# Patient Record
Sex: FEMALE | Race: BLACK OR AFRICAN AMERICAN | NOT HISPANIC OR LATINO | URBAN - METROPOLITAN AREA
[De-identification: names, ages, dates, MRNs, and addresses within clinical notes are randomized per-mention and may not be internally consistent; named-entity substitution may affect disease eponyms.]

---

## 2020-06-26 ENCOUNTER — OUTPATIENT (OUTPATIENT)
Dept: OUTPATIENT SERVICES | Facility: HOSPITAL | Age: 55
LOS: 1 days | End: 2020-06-26
Payer: COMMERCIAL

## 2020-06-26 DIAGNOSIS — Z11.59 ENCOUNTER FOR SCREENING FOR OTHER VIRAL DISEASES: ICD-10-CM

## 2020-06-26 DIAGNOSIS — Z98.89 OTHER SPECIFIED POSTPROCEDURAL STATES: Chronic | ICD-10-CM

## 2020-06-26 DIAGNOSIS — Z90.49 ACQUIRED ABSENCE OF OTHER SPECIFIED PARTS OF DIGESTIVE TRACT: Chronic | ICD-10-CM

## 2020-06-26 PROCEDURE — U0003: CPT

## 2020-06-27 LAB — SARS-COV-2 RNA SPEC QL NAA+PROBE: SIGNIFICANT CHANGE UP

## 2020-12-05 DIAGNOSIS — Z01.818 ENCOUNTER FOR OTHER PREPROCEDURAL EXAMINATION: ICD-10-CM

## 2020-12-05 PROBLEM — Z00.00 ENCOUNTER FOR PREVENTIVE HEALTH EXAMINATION: Status: ACTIVE | Noted: 2020-12-05

## 2020-12-07 ENCOUNTER — APPOINTMENT (OUTPATIENT)
Dept: DISASTER EMERGENCY | Facility: CLINIC | Age: 55
End: 2020-12-07

## 2020-12-10 ENCOUNTER — APPOINTMENT (OUTPATIENT)
Dept: PULMONOLOGY | Facility: CLINIC | Age: 55
End: 2020-12-10

## 2021-07-06 ENCOUNTER — TRANSCRIPTION ENCOUNTER (OUTPATIENT)
Age: 56
End: 2021-07-06

## 2021-08-07 ENCOUNTER — NON-APPOINTMENT (OUTPATIENT)
Age: 56
End: 2021-08-07

## 2021-08-07 ENCOUNTER — APPOINTMENT (OUTPATIENT)
Dept: OBGYN | Facility: CLINIC | Age: 56
End: 2021-08-07
Payer: COMMERCIAL

## 2021-08-07 VITALS
DIASTOLIC BLOOD PRESSURE: 64 MMHG | WEIGHT: 129 LBS | BODY MASS INDEX: 23.74 KG/M2 | SYSTOLIC BLOOD PRESSURE: 124 MMHG | HEIGHT: 62 IN

## 2021-08-07 DIAGNOSIS — Z01.419 ENCOUNTER FOR GYNECOLOGICAL EXAMINATION (GENERAL) (ROUTINE) W/OUT ABNORMAL FINDINGS: ICD-10-CM

## 2021-08-07 DIAGNOSIS — Z12.39 ENCOUNTER FOR OTHER SCREENING FOR MALIGNANT NEOPLASM OF BREAST: ICD-10-CM

## 2021-08-07 DIAGNOSIS — Z12.4 ENCOUNTER FOR SCREENING FOR MALIGNANT NEOPLASM OF CERVIX: ICD-10-CM

## 2021-08-07 DIAGNOSIS — Z13.820 ENCOUNTER FOR SCREENING FOR OSTEOPOROSIS: ICD-10-CM

## 2021-08-07 DIAGNOSIS — Z72.3 LACK OF PHYSICAL EXERCISE: ICD-10-CM

## 2021-08-07 DIAGNOSIS — Z80.0 FAMILY HISTORY OF MALIGNANT NEOPLASM OF DIGESTIVE ORGANS: ICD-10-CM

## 2021-08-07 PROCEDURE — 99386 PREV VISIT NEW AGE 40-64: CPT

## 2021-08-07 NOTE — HISTORY OF PRESENT ILLNESS
[Menarche Age: ____] : age at menarche was [unfilled] [Currently Active] : currently active [Men] : men [postmenopausal] : postmenopausal [N] : Patient is not sexually active [unknown] : Patient is unsure of the date of her LMP [Post-Menopause, No Sxs] : post-menopausal, currently without symptoms [FreeTextEntry1] : 55-year-old postmenopausal patient presents for annual gynecological exam without complaints\par \par She is Lazara Balderrama's mom-she is moving to [place] in the [facility] and very excited this month\par \par She eats well and believes in diet as medicine\par \par Denies any medical history or vaginal bleeding\par \par She has not had a mammogram or Pap smear in 5 years and the benefits were reviewed\par She reports a normal bone density in 2019\par She is scheduled for screening colonoscopy soon she states [Mammogramdate] : 2016 [TextBox_19] : WNL [PapSmeardate] : 2016 [TextBox_31] : WNL [BoneDensityDate] : 2019 [TextBox_37] : WNL [PGxTotal] : 7 [[facility]] : 6 [[facility]] : 6 [PGHxABSpont] : 1

## 2021-08-14 LAB
CYTOLOGY CVX/VAG DOC THIN PREP: ABNORMAL
HPV HIGH+LOW RISK DNA PNL CVX: NOT DETECTED

## 2022-08-20 ENCOUNTER — APPOINTMENT (OUTPATIENT)
Dept: OBGYN | Facility: CLINIC | Age: 57
End: 2022-08-20

## 2022-11-03 ENCOUNTER — EMERGENCY (EMERGENCY)
Facility: HOSPITAL | Age: 57
LOS: 1 days | Discharge: HOME | End: 2022-11-03
Attending: EMERGENCY MEDICINE | Admitting: EMERGENCY MEDICINE

## 2022-11-03 VITALS
OXYGEN SATURATION: 90 % | RESPIRATION RATE: 20 BRPM | SYSTOLIC BLOOD PRESSURE: 104 MMHG | TEMPERATURE: 98 F | HEART RATE: 133 BPM | DIASTOLIC BLOOD PRESSURE: 73 MMHG

## 2022-11-03 VITALS
OXYGEN SATURATION: 100 % | SYSTOLIC BLOOD PRESSURE: 101 MMHG | RESPIRATION RATE: 18 BRPM | DIASTOLIC BLOOD PRESSURE: 77 MMHG | HEART RATE: 110 BPM

## 2022-11-03 DIAGNOSIS — Z98.89 OTHER SPECIFIED POSTPROCEDURAL STATES: Chronic | ICD-10-CM

## 2022-11-03 DIAGNOSIS — Z90.49 ACQUIRED ABSENCE OF OTHER SPECIFIED PARTS OF DIGESTIVE TRACT: Chronic | ICD-10-CM

## 2022-11-03 LAB
ALBUMIN SERPL ELPH-MCNC: 4.1 G/DL — SIGNIFICANT CHANGE UP (ref 3.3–5)
ALP SERPL-CCNC: 69 U/L — SIGNIFICANT CHANGE UP (ref 40–120)
ALT FLD-CCNC: 11 U/L — SIGNIFICANT CHANGE UP (ref 4–33)
ANION GAP SERPL CALC-SCNC: 8 MMOL/L — SIGNIFICANT CHANGE UP (ref 7–14)
ANISOCYTOSIS BLD QL: SLIGHT — SIGNIFICANT CHANGE UP
AST SERPL-CCNC: 25 U/L — SIGNIFICANT CHANGE UP (ref 4–32)
BASOPHILS # BLD AUTO: 0.08 K/UL — SIGNIFICANT CHANGE UP (ref 0–0.2)
BASOPHILS NFR BLD AUTO: 2.6 % — HIGH (ref 0–2)
BILIRUB SERPL-MCNC: 1.8 MG/DL — HIGH (ref 0.2–1.2)
BUN SERPL-MCNC: 18 MG/DL — SIGNIFICANT CHANGE UP (ref 7–23)
BURR CELLS BLD QL SMEAR: PRESENT — SIGNIFICANT CHANGE UP
CALCIUM SERPL-MCNC: 9.6 MG/DL — SIGNIFICANT CHANGE UP (ref 8.4–10.5)
CHLORIDE SERPL-SCNC: 100 MMOL/L — SIGNIFICANT CHANGE UP (ref 98–107)
CO2 SERPL-SCNC: 29 MMOL/L — SIGNIFICANT CHANGE UP (ref 22–31)
CREAT SERPL-MCNC: 0.58 MG/DL — SIGNIFICANT CHANGE UP (ref 0.5–1.3)
D DIMER BLD IA.RAPID-MCNC: <150 NG/ML DDU — SIGNIFICANT CHANGE UP
EGFR: 105 ML/MIN/1.73M2 — SIGNIFICANT CHANGE UP
EOSINOPHIL # BLD AUTO: 0.13 K/UL — SIGNIFICANT CHANGE UP (ref 0–0.5)
EOSINOPHIL NFR BLD AUTO: 4.3 % — SIGNIFICANT CHANGE UP (ref 0–6)
FLUAV AG NPH QL: SIGNIFICANT CHANGE UP
FLUBV AG NPH QL: SIGNIFICANT CHANGE UP
GIANT PLATELETS BLD QL SMEAR: PRESENT — SIGNIFICANT CHANGE UP
GLUCOSE SERPL-MCNC: 95 MG/DL — SIGNIFICANT CHANGE UP (ref 70–99)
HCT VFR BLD CALC: 40.8 % — SIGNIFICANT CHANGE UP (ref 34.5–45)
HGB BLD-MCNC: 12.3 G/DL — SIGNIFICANT CHANGE UP (ref 11.5–15.5)
HYPOCHROMIA BLD QL: SLIGHT — SIGNIFICANT CHANGE UP
IANC: 1.44 K/UL — LOW (ref 1.8–7.4)
LYMPHOCYTES # BLD AUTO: 1.26 K/UL — SIGNIFICANT CHANGE UP (ref 1–3.3)
LYMPHOCYTES # BLD AUTO: 40.9 % — SIGNIFICANT CHANGE UP (ref 13–44)
MACROCYTES BLD QL: SLIGHT — SIGNIFICANT CHANGE UP
MANUAL SMEAR VERIFICATION: SIGNIFICANT CHANGE UP
MCHC RBC-ENTMCNC: 28.1 PG — SIGNIFICANT CHANGE UP (ref 27–34)
MCHC RBC-ENTMCNC: 30.1 GM/DL — LOW (ref 32–36)
MCV RBC AUTO: 93.4 FL — SIGNIFICANT CHANGE UP (ref 80–100)
MONOCYTES # BLD AUTO: 0.22 K/UL — SIGNIFICANT CHANGE UP (ref 0–0.9)
MONOCYTES NFR BLD AUTO: 7 % — SIGNIFICANT CHANGE UP (ref 2–14)
NEUTROPHILS # BLD AUTO: 1.34 K/UL — LOW (ref 1.8–7.4)
NEUTROPHILS NFR BLD AUTO: 43.5 % — SIGNIFICANT CHANGE UP (ref 43–77)
NT-PROBNP SERPL-SCNC: 1970 PG/ML — HIGH
OVALOCYTES BLD QL SMEAR: SLIGHT — SIGNIFICANT CHANGE UP
PLAT MORPH BLD: ABNORMAL
PLATELET # BLD AUTO: 127 K/UL — LOW (ref 150–400)
PLATELET COUNT - ESTIMATE: ABNORMAL
POIKILOCYTOSIS BLD QL AUTO: SLIGHT — SIGNIFICANT CHANGE UP
POLYCHROMASIA BLD QL SMEAR: SLIGHT — SIGNIFICANT CHANGE UP
POTASSIUM SERPL-MCNC: 5.1 MMOL/L — SIGNIFICANT CHANGE UP (ref 3.5–5.3)
POTASSIUM SERPL-SCNC: 5.1 MMOL/L — SIGNIFICANT CHANGE UP (ref 3.5–5.3)
PROT SERPL-MCNC: 7.5 G/DL — SIGNIFICANT CHANGE UP (ref 6–8.3)
RBC # BLD: 4.37 M/UL — SIGNIFICANT CHANGE UP (ref 3.8–5.2)
RBC # FLD: 12.4 % — SIGNIFICANT CHANGE UP (ref 10.3–14.5)
RBC BLD AUTO: SIGNIFICANT CHANGE UP
RSV RNA NPH QL NAA+NON-PROBE: SIGNIFICANT CHANGE UP
SARS-COV-2 RNA SPEC QL NAA+PROBE: SIGNIFICANT CHANGE UP
SODIUM SERPL-SCNC: 137 MMOL/L — SIGNIFICANT CHANGE UP (ref 135–145)
TROPONIN T, HIGH SENSITIVITY RESULT: 12 NG/L — SIGNIFICANT CHANGE UP
VARIANT LYMPHS # BLD: 1.7 % — SIGNIFICANT CHANGE UP (ref 0–6)
WBC # BLD: 3.09 K/UL — LOW (ref 3.8–10.5)
WBC # FLD AUTO: 3.09 K/UL — LOW (ref 3.8–10.5)

## 2022-11-03 PROCEDURE — 71046 X-RAY EXAM CHEST 2 VIEWS: CPT | Mod: 26

## 2022-11-03 PROCEDURE — 99285 EMERGENCY DEPT VISIT HI MDM: CPT

## 2022-11-03 NOTE — PROVIDER CONTACT NOTE (OTHER) - ASSESSMENT
CM called by PA in regards to d/c planning. Pt has Home O2 from [facility] [phone] based in [place]. Pt is now back at her [place] residence and does not have Oxygen set up here. JANE Spoke with daughter Felecia Mejia [phone] who states pt has home O2 system in [place] and will be going back and forth between both locations. [employer] Company does not services [facility]. Daughter drove to [facility] and picked up Portable and concentration O2 systems for pt. Pt d/c with oxygen as needed. Pt to follow up with [place] doctors before returning to [place].

## 2022-11-03 NOTE — ED PROVIDER NOTE - NS ED ATTENDING STATEMENT MOD
This was a shared visit with the JESE. I reviewed and verified the documentation and independently performed the documented:

## 2022-11-03 NOTE — ED PROVIDER NOTE - NSFOLLOWUPINSTRUCTIONS_ED_ALL_ED_FT
Home Oxygen Use, Adult       When a medical condition keeps you from getting enough oxygen, your health care provider may instruct you to take extra oxygen at home. Your health care provider will let you know:  •When to take oxygen.      •How long to take oxygen.      •How quickly oxygen should be delivered (flow rate), in liters per minute (LPM or L/M).      Home oxygen can be given through:  •A mask.      •A nasal cannula. This is a device or tube that goes in the nostrils.      •A transtracheal catheter. This is a small, thin tube placed in the windpipe (trachea).      •A breathing tube (tracheostomy tube) that is surgically placed in the windpipe. This may be used in severe cases.      These devices are connected with tubing to an oxygen source, such as:  •A tank. Tanks hold oxygen in gas form. They must be replaced when the oxygen is used up.      •A liquid oxygen device. This holds oxygen in liquid form. Liquid oxygen is very cold. It must be replaced when the oxygen is used up.    •An oxygen concentrator machine. This filters oxygen in the room. There are two types of oxygen concentrator machines—stationary and portable.  •A stationary oxygen concentrator machine plugs into the main electricity supply at your home. You must have a backup cylinder of oxygen in case the power goes out.      •A portable oxygen concentrator machine is smaller in size and more lightweight. This machine uses battery supply and can be used outside the home.        Work with your health care provider to find equipment that works best for you and your lifestyle.      What are the risks?    Delivery of supplemental oxygen is generally safe. However, some risks include:  •Fire. This can happen if the oxygen is exposed to a heat source, flame, or spark.      •Injury to skin. This can happen if liquid oxygen touches your skin.      •Damage to the lungs or other organs. This can happen from getting too little or too much oxygen.        Supplies needed:    To use oxygen, you will need:  •A mask, nasal cannula, transtracheal catheter, or tracheostomy.      •An oxygen tank, a liquid oxygen device, or an oxygen concentrator.      •The tape that your health care provider recommends (optional).      Your health care provider may also recommend:  •A humidifier to warm and moisten the oxygen delivered. This will depend on how much oxygen you need and the type of home oxygen device you use.      •A pulse oximeter. This device measures the percentage of oxygen in your blood.        How to use oxygen     Your health care provider or a person from your medical device company will show you how to use your oxygen device. Follow his or her instructions. The instructions may look something like this:  1.Wash your hands with soap and water.      2.If you use an oxygen concentrator, make sure it is plugged in.      3.Place one end of the tube into the port on the tank, device, or machine.      4.Place the mask over your nose and mouth. Or, place the nasal cannula and secure it with tape if instructed. If you use a tracheostomy or transtracheal catheter, connect it to the oxygen source as directed.      5.Make sure the liter-flow setting on the machine is at the level prescribed by your health care provider.      6.Turn on the machine or adjust the knob on the tank or device to the correct liter-flow setting.      7.When you are done, turn off and unplug the machine, or turn the knob to OFF.        How to clean and care for the oxygen supplies    Nasal cannula     •Clean it with a warm, wet cloth daily or as needed.      •Wash it with a liquid soap once a week.      •Rinse it thoroughly once or twice a week.      •Air-dry it.      •Replace it every 2–4 weeks.      •If you have an infection, such as a cold or pneumonia, change the cannula when you get better.      Mask     •Replace it every 2–4 weeks.      •If you have an infection, such as a cold or pneumonia, change the mask when you get better.      Humidifier bottle   •Wash the bottle between each refill:  •Wash it with soap and warm water.      •Rinse it thoroughly.      •Clean it and its top with a disinfectant .      •Air-dry it.      •Make sure it is dry before you refill it.        Oxygen concentrator     •Clean the air filter at least twice a week according to directions from your home medical equipment and service company.    •Wipe down the cabinet every day. To do this:  •Unplug the unit.      •Wipe down the cabinet with a damp cloth.      •Dry the cabinet.        Other equipment     •Change any extra tubing every 1–3 months.      •Follow instructions from your health care provider about taking care of any other equipment.        Safety tips      Fire safety tips      •Keep your oxygen and oxygen supplies at least 6 ft (2 m) away from sources of heat, flames, and cowan at all times.      • Do not allow smoking near your oxygen. Put up "no smoking" signs in your home. Avoid smoking areas when in public.    • Do not use materials that can burn (are flammable) while you use oxygen. This includes:  •Petroleum jelly.      •Hair spray or other aerosol sprays.      •Rubbing alcohol.      •Hand .        •When you go to a restaurant with portable oxygen, ask to be seated in the non-smoking section.      •Keep a fire extinguisher close by. Let your fire department know that you have oxygen in your home.      •Test your home smoke detectors regularly.      Traveling     •Secure your oxygen tank in the vehicle so that it does not move around. Follow instructions from your medical device company about how to safely secure your tank.      •Make sure you have enough oxygen for the amount of time you will be away from home.      •If you are planning to travel by public transportation (airplane, train, bus, or boat), contact the company to find out if it allows the use of an approved portable oxygen concentrator. You may also need documents from your health care provider and medical device company before you travel.      General safety tips     •If you use an oxygen cylinder, make sure it is in a stand or secured to an object that will not move (fixed object).      •If you use liquid oxygen, make sure its container is kept upright at all times.    •If you use an oxygen concentrator:  •Tell your electric company. Make sure you are given priority service in the event that your power goes out.      •Avoid using extension cords if possible.          Follow these instructions at home:    •Use oxygen only as told by your health care provider.      • Do not use alcohol or other drugs that make you relax (sedating drugs) unless instructed. They can slow down your breathing rate and make it hard to get in enough oxygen.      •Know how and when to order a refill of oxygen.      •Always keep a spare tank of oxygen. Plan ahead for holidays when you may not be able to get a prescription filled.      •Use water-based lubricants on your lips or nostrils. Do not use oil-based products like petroleum jelly.      •To prevent skin irritation on your cheeks or behind your ears, tuck some gauze under the tubing.        Where to find more information    •American Lung Association: www.lung.org/oxygen        Contact a health care provider if:    •You get headaches often.      •You have a lasting cough.      •You are restless or have anxiety.      •You develop an illness that affects your breathing.      •You cannot exercise at your regular level.      •You have a fever.      •You have persistent redness under your nose.        Get help right away if:    •You are confused.      •You are sleepy all the time.      •You have blue lips or fingernails.      •You have difficult or irregular breathing that is getting worse.      •You are struggling to breathe.      These symptoms may represent a serious problem that is an emergency. Do not wait to see if the symptoms will go away. Get medical help right away. Call your local emergency services (911 in the [place] Do not drive yourself to the hospital.       Summary    •Your health care provider or a person from your medical device company will show you how to use your oxygen device. Follow his or her instructions.      •If you use an oxygen concentrator, make sure it is plugged in.      •Make sure the liter-flow setting on the machine is at the level prescribed by your health care provider.      •Use oxygen only as told by your health care provider.      •Keep your oxygen and oxygen supplies at least 6 ft (2 m) away from sources of heat, flames, and cowan at all times.      This information is not intended to replace advice given to you by your health care provider. Make sure you discuss any questions you have with your health care provider.

## 2022-11-03 NOTE — ED PROVIDER NOTE - CLINICAL SUMMARY MEDICAL DECISION MAKING FREE TEXT BOX
Patient is a 57 y.o female with PMHx of sarcoidosis, ?cva on Eliquis and ASA, HTN, recent admission to Hospital in [place] for Dyspnea and LE edema c/b hypoxia requiring intubation now on home oxygen, presents to ED stating she needs her home O2.     Plan for labs, trop, bnp, ecg, cxr, d-dimer though low suspicion for PE, rectal temp. Pt will need CM consult to help set up her home O2. Likely will require admission.

## 2022-11-03 NOTE — ED ADULT NURSE NOTE - OBJECTIVE STATEMENT
Pt received to room 24. Pt A&Ox4, ambulatory at baseline. Pt c/o SOB. Pt states she was recently admitted to hospital in [place] for LE edema and SOB which ended up requiring intubation. Pt now on home 02 but does not have access to it so presents to ED today requesting O2. Pt 100% on 2L [place], placed on CM, noted to be sinus tach. 98.8F rectally. Denies any CP, dizziness, weakness, fevers, chills. Iv access established with 20G to L Ac, labs sent as per orders. Daughter at bedside, safety measures in place

## 2022-11-03 NOTE — ED ADULT TRIAGE NOTE - CHIEF COMPLAINT QUOTE
Pt recently arriving from [place], discharged from hospital in October. Recently diagnosed with heart failure. As per daughter, "My mother recently coded in the hospital and they had to take out 7 liters of fluid from her lungs. She was intubated after that but now she needs oxygen at home. The insurance company is saying oxygen won't be delivered until the next 4 weeks. " Pt tachycardic. Denies cp/sob at this time. Pt on Eliquis.

## 2022-11-03 NOTE — ED PROVIDER NOTE - PROGRESS NOTE DETAILS
BRANDON Snyder- Pt labs and imaging wnl. Plan to touch base with CM in AM and likely place in CDU for home O2 to be set up. BRANDON Boone: Spoke with JANE Yousif this am. After discussion with patient and daughter, daughter able to  O2 supplies from [place], will likely be able to pick patient up at 4pm today. patient aware. Patient otherwise stable for dc.

## 2022-11-03 NOTE — ED PROVIDER NOTE - OBJECTIVE STATEMENT
HPI- Patient is a 57 y.o female with PMHx of sarcoidosis, ?cva on Eliquis and ASA, HTN, recent admission to Hospital in [place] for Dyspnea and LE edema c/b hypoxia requiring intubation now on home oxygen, presents to ED stating she needs her home O2. Pt states she is visiting from [place] because she has multiple doctor appointments set up. States she normally uses her home O2 at night or when she is out running errands or exerting herself. Pt was unable to obtain her home o2 while on this trip to [place]. Pt noted to be tachycardic to 120's on arrival. Pt denying any current sob, chest pain, weakness, fevers, chills, URI sx, dizziness, n/v/d, LE swelling or any other complaints. No hx of DVT/PE. HPI- Patient is a 57 y.o female with PMHx of sarcoidosis, ?cva on Eliquis and ASA, HTN, CHF,  recent admission to Hospital in [place] for Dyspnea and LE edema c/b hypoxia requiring intubation now on home oxygen, presents to ED stating she needs her home O2. Pt states she is visiting from [place] because she has multiple doctor appointments set up. States she normally uses her home O2 at night or when she is out running errands or exerting herself. Pt was unable to obtain her home o2 while on this trip to [place]. Pt noted to be tachycardic to 120's on arrival. Pt denying any current sob, chest pain, weakness, fevers, chills, URI sx, dizziness, n/v/d, LE swelling or any other complaints. No hx of DVT/PE.

## 2022-11-03 NOTE — ED PROVIDER NOTE - PATIENT PORTAL LINK FT
You can access the FollowMyHealth Patient Portal offered by [facility] by registering at the following website: [email or web address]. By joining [employer]’s FollowMyHealth portal, you will also be able to view your health information using other applications (apps) compatible with our system.

## 2022-11-03 NOTE — ED PROVIDER NOTE - ATTENDING APP SHARED VISIT CONTRIBUTION OF CARE
56yo F with PMHX pulmonary sarcoidosis on home O2, HTN, CHF, recent admission in [place] for fluid overload requiring intubation in July 2023 and another admission in Oct 2023 for ?CVA, now started on eliquis, presenting to [facility] ED for home oxygen services.  Says she is transferring her care to [place] and has multiple outpatient doctors appointments with specialist in [place] but has been unable to set up her home O2 here.  Says she uses O2 at night and during activities at home and without is becomes very sob.  At rest has no symptoms.  No new symptoms, no chest pains or other complaints    EXAM  on supplemental O2 via [place] 2L/min  HR 120s bpm, rectally afebrile   lungs clear without crackles  no significant peripheral edema    EKG sinus tachycardia with inferior lateral T wave inversions- no priors to compare    a/p  needs home O2, otherwise asymptomatic  tachycardia ?from PE in setting of recent hospitalization in Oct and car ride from out of state  will get labs with ddimer, troponin, cxr  if studies with no acute abnormalities, will need admission vs CDU for case management to set up O2 at home

## 2022-11-03 NOTE — ED PROVIDER NOTE - NSICDXPASTSURGICALHX_GEN_ALL_CORE_FT
PAST SURGICAL HISTORY:  S/P arthroscopy of right knee 9/16/2015    S/P laparoscopic cholecystectomy 2014

## 2022-11-03 NOTE — ED ADULT NURSE REASSESSMENT NOTE - NS ED NURSE REASSESS COMMENT FT1
as per MD pt allowed to take at home medications.
pt resting comfortably in bed, A&Ox4, ambulatory in no respiratory distress. Pt on 2L [place] sating at 100%, RR equal and unlabored, lung sounds clear, +2 radial pulses bilaterally, abdomen soft, non-tender, non-distended. call bell in reach, comfort measures provided. Awaiting dispo.

## 2022-11-03 NOTE — ED PROVIDER NOTE - PHYSICAL EXAMINATION
Vital signs reviewed.   CONSTITUTIONAL: Well-appearing; well-nourished; in no apparent distress. Non-toxic appearing.   HEAD: Normocephalic, atraumatic.  EYES: PERRL, EOM intact, conjunctiva and sclera WNL.  ENT: normal nose; no rhinorrhea;   CARD: Normal S1, S2; no murmurs  RESP: Normal chest excursion with respiration; breath sounds clear and equal bilaterally; no wheezes, rhonchi, or rales. Not tachypneic. Speaking in full sentences.   ABD/GI: soft, non-distended;  EXT/MS: moves all extremities; distal pulses are normal, no pedal edema.  SKIN: Normal for age and race  NEURO: Awake, alert, oriented x 3, no gross deficits  PSYCH: Normal mood; appropriate affect.

## 2023-04-20 ENCOUNTER — EMERGENCY (EMERGENCY)
Facility: HOSPITAL | Age: 58
LOS: 0 days | Discharge: HOME | End: 2023-04-21
Attending: EMERGENCY MEDICINE
Payer: COMMERCIAL

## 2023-04-20 VITALS
HEART RATE: 101 BPM | HEIGHT: 62 IN | TEMPERATURE: 98 F | RESPIRATION RATE: 18 BRPM | SYSTOLIC BLOOD PRESSURE: 103 MMHG | WEIGHT: 126.99 LBS | DIASTOLIC BLOOD PRESSURE: 71 MMHG | OXYGEN SATURATION: 100 %

## 2023-04-20 DIAGNOSIS — I48.91 UNSPECIFIED ATRIAL FIBRILLATION: ICD-10-CM

## 2023-04-20 DIAGNOSIS — I99.8 OTHER DISORDER OF CIRCULATORY SYSTEM: ICD-10-CM

## 2023-04-20 DIAGNOSIS — Z98.89 OTHER SPECIFIED POSTPROCEDURAL STATES: Chronic | ICD-10-CM

## 2023-04-20 DIAGNOSIS — I95.9 HYPOTENSION, UNSPECIFIED: ICD-10-CM

## 2023-04-20 DIAGNOSIS — Z20.822 CONTACT WITH AND (SUSPECTED) EXPOSURE TO COVID-19: ICD-10-CM

## 2023-04-20 DIAGNOSIS — Z90.49 ACQUIRED ABSENCE OF OTHER SPECIFIED PARTS OF DIGESTIVE TRACT: Chronic | ICD-10-CM

## 2023-04-20 DIAGNOSIS — R00.0 TACHYCARDIA, UNSPECIFIED: ICD-10-CM

## 2023-04-20 LAB
BASOPHILS # BLD AUTO: 0.03 K/UL — SIGNIFICANT CHANGE UP (ref 0–0.2)
BASOPHILS NFR BLD AUTO: 0.7 % — SIGNIFICANT CHANGE UP (ref 0–2)
EOSINOPHIL # BLD AUTO: 0.15 K/UL — SIGNIFICANT CHANGE UP (ref 0–0.5)
EOSINOPHIL NFR BLD AUTO: 3.4 % — SIGNIFICANT CHANGE UP (ref 0–6)
HCT VFR BLD CALC: 43.1 % — SIGNIFICANT CHANGE UP (ref 34.5–45)
HGB BLD-MCNC: 13.5 G/DL — SIGNIFICANT CHANGE UP (ref 11.5–15.5)
IMM GRANULOCYTES NFR BLD AUTO: 0.2 % — SIGNIFICANT CHANGE UP (ref 0–0.9)
LYMPHOCYTES # BLD AUTO: 1.33 K/UL — SIGNIFICANT CHANGE UP (ref 1–3.3)
LYMPHOCYTES # BLD AUTO: 30.6 % — SIGNIFICANT CHANGE UP (ref 13–44)
MCHC RBC-ENTMCNC: 30.6 PG — SIGNIFICANT CHANGE UP (ref 27–34)
MCHC RBC-ENTMCNC: 31.3 G/DL — LOW (ref 32–36)
MCV RBC AUTO: 97.7 FL — SIGNIFICANT CHANGE UP (ref 80–100)
MONOCYTES # BLD AUTO: 0.44 K/UL — SIGNIFICANT CHANGE UP (ref 0–0.9)
MONOCYTES NFR BLD AUTO: 10.1 % — SIGNIFICANT CHANGE UP (ref 2–14)
NEUTROPHILS # BLD AUTO: 2.39 K/UL — SIGNIFICANT CHANGE UP (ref 1.8–7.4)
NEUTROPHILS NFR BLD AUTO: 55 % — SIGNIFICANT CHANGE UP (ref 43–77)
NRBC # BLD: 0 /100 WBCS — SIGNIFICANT CHANGE UP (ref 0–0)
PLATELET # BLD AUTO: 144 K/UL — LOW (ref 150–400)
RBC # BLD: 4.41 M/UL — SIGNIFICANT CHANGE UP (ref 3.8–5.2)
RBC # FLD: 12.8 % — SIGNIFICANT CHANGE UP (ref 10.3–14.5)
WBC # BLD: 4.35 K/UL — SIGNIFICANT CHANGE UP (ref 3.8–10.5)
WBC # FLD AUTO: 4.35 K/UL — SIGNIFICANT CHANGE UP (ref 3.8–10.5)

## 2023-04-20 PROCEDURE — 99285 EMERGENCY DEPT VISIT HI MDM: CPT

## 2023-04-20 PROCEDURE — 93010 ELECTROCARDIOGRAM REPORT: CPT | Mod: 76

## 2023-04-20 RX ORDER — ASPIRIN/CALCIUM CARB/MAGNESIUM 324 MG
324 TABLET ORAL ONCE
Refills: 0 | Status: COMPLETED | OUTPATIENT
Start: 2023-04-20 | End: 2023-04-20

## 2023-04-20 RX ADMIN — Medication 324 MILLIGRAM(S): at 23:33

## 2023-04-20 NOTE — ED PROVIDER NOTE - PATIENT PORTAL LINK FT
You can access the FollowMyHealth Patient Portal offered by [facility] by registering at the following website: [email or web address]. By joining [facility]’s FollowMyHealth portal, you will also be able to view your health information using other applications (apps) compatible with our system.

## 2023-04-20 NOTE — ED ADULT TRIAGE NOTE - CHIEF COMPLAINT QUOTE
pt here for hypotension.  per pt, bp has been fluctuating in the systolics from 60-90's.  Pt had recent ablation procedure on 04/12 for HF.  pt on eliquis and aspirin.  STAT EKG in triage shows pt in Afib (Read by MD Galloway).  pt also endorses that she felt a little faint but did not LOC.  pt does not appear in distress at the moment, talking in full complete sentences.  pt has prn O2 at home, 2L [place], sat 100%.  pt denies any chest discomfort, sob, n/v/d.  NKDA, hx of heart failure, pulmonary HTN, TIA in 2022 and gallbladder removal in 2014.

## 2023-04-20 NOTE — ED PROVIDER NOTE - PHYSICAL EXAMINATION
Vitals: tachy at 101, otherwise WNL, pressure of 103/71 on monitor  Gen: AAOx3, NAD, sitting comfortably in stretcher, calm, non-toxic, baseline mental status answering questions appropriately   Head: ncat, perrla, eomi b/l  Neck: supple, no lymphadenopathy, no midline deviation  Heart: rrr, no m/r/g  Lungs: CTA b/l, no rales/ronchi/wheezes  Abd: soft, nontender, non-distended, no rebound or guarding  Ext: no clubbing/cyanosis/edema  Neuro: sensation and muscle strength intact b/l, steady gait, no focal weakness or sensory loss

## 2023-04-20 NOTE — ED PROVIDER NOTE - NSFOLLOWUPCLINICS_GEN_ALL_ED_FT
[facility]  Cardiology  [address] [place] [id number]  Phone: [phone]  Fax:   Follow Up Time: Urgent

## 2023-04-20 NOTE — ED PROVIDER NOTE - CARE PLAN
1 Principal Discharge DX:	Hypotension   Principal Discharge DX:	Hypotension  Secondary Diagnosis:	Atrial fibrillation with RVR

## 2023-04-20 NOTE — ED PROVIDER NOTE - PROGRESS NOTE DETAILS
Results reported to patient--grossly benign, ekg shows ischemia, but unchanged from last EKG in system, not acute TWIs, trop is slightly elevated but not trending up significantly, likely 2/2 chf, pressure if very close to patient's normal, which is chronically hypotensive, per patient's 24 hours journal/log  Pt. reports feeling better after meds, pt. currently has no complaints and prefers to go home  pt. agrees to f/u with primary care outpt. asap, pt. will f/u with private cardio urgently   pt. understands to return to ED if symptoms worsen; will d/c I personally spoke to pt's cardiology service on call doctor, Dr. Carlos Maldonado, who states he will have the office call pt. in AM and arrange stat appointment, but no reason for admission at this time  pt. is comfortable going home, she understands to continue to monitor pressure as she has been and return if symptoms develop or pressure decreases further

## 2023-04-20 NOTE — ED PROVIDER NOTE - OBJECTIVE STATEMENT
58 yo F with hypotension, found on routine BP checking at home.  no inciting event.  Pt. is otherwise well and denies other associated symptoms/complaints.  Pt. noted her pressure was running about 10 mmHg beclow her usual pressure of 90/70.  Today throughout most of the day she's been running about 80/60.  pt. has no complaints related to low pressure.  Of note, pt. had a cardiac ablation performed at [facility] 8 days ago--she's been doing well post-op until today when pressure change was noted.    ROS: negative for fever, cough, headache, chest pain, shortness of breath, abd pain, nausea, vomiting, diarrhea, rash, paresthesia, and weakness--all other systems reviewed are negative.   PMH: negative; Meds: Denies; SH: Denies smoking/drinking/drug use   EP Cardio: Dr. Dylon Orr, Cardio: Dr. Randal Goldberg; pt. has scheduled appt's for this coming Monday for both doctors 58 yo F with hypotension, found on routine BP checking at home.  no inciting event.  Pt. is otherwise well and denies other associated symptoms/complaints.  Pt. noted her pressure was running about 10 mmHg below her usual pressure of 90/70.  Today throughout most of the day she's been running about 80/60.  pt. has no complaints related to low pressure.  Of note, pt. had a cardiac ablation performed at [facility] 8 days ago--she's been doing well post-op until today when pressure change was noted.    ROS: negative for fever, cough, headache, chest pain, shortness of breath, abd pain, nausea, vomiting, diarrhea, rash, paresthesia, and weakness--all other systems reviewed are negative.   PMH: negative; Meds: Denies; SH: Denies smoking/drinking/drug use   EP Cardio: Dr. Dylon Orr, Cardio: Dr. Randal Goldberg; pt. has scheduled appt's for this coming Monday for both doctors

## 2023-04-20 NOTE — ED PROVIDER NOTE - CLINICAL SUMMARY MEDICAL DECISION MAKING FREE TEXT BOX
56 yo F with hypotension today, 8 days after cardiac ablation  -basic labs, hcg, lipase, mag, bnp, rvp, trop, ua, CXr, ekg, iv, asa, monitor  -f/u results, reeval

## 2023-04-20 NOTE — ED ADULT NURSE NOTE - OBJECTIVE STATEMENT
Pt is a 57y F AOx4 with a pmh of HTN, Afib on Eliquis,CHF cardiac ablation on 04/12/2023. Pt reports she was home taking her BP and noticed it was low "80s/60". Pt denies any cp, sob, n/.v/d, lightheadedness, headahce, or any acute distress. Pt placed on cardiac monitor.

## 2023-04-21 VITALS — SYSTOLIC BLOOD PRESSURE: 91 MMHG | DIASTOLIC BLOOD PRESSURE: 66 MMHG

## 2023-04-21 LAB
ALBUMIN SERPL ELPH-MCNC: 3.2 G/DL — LOW (ref 3.3–5)
ALP SERPL-CCNC: 46 U/L — SIGNIFICANT CHANGE UP (ref 40–120)
ALT FLD-CCNC: 21 U/L — SIGNIFICANT CHANGE UP (ref 12–78)
ANION GAP SERPL CALC-SCNC: 3 MMOL/L — LOW (ref 5–17)
APPEARANCE UR: CLEAR — SIGNIFICANT CHANGE UP
AST SERPL-CCNC: 16 U/L — SIGNIFICANT CHANGE UP (ref 15–37)
BILIRUB SERPL-MCNC: 1.9 MG/DL — HIGH (ref 0.2–1.2)
BILIRUB UR-MCNC: NEGATIVE — SIGNIFICANT CHANGE UP
BUN SERPL-MCNC: 14 MG/DL — SIGNIFICANT CHANGE UP (ref 7–23)
CALCIUM SERPL-MCNC: 9.2 MG/DL — SIGNIFICANT CHANGE UP (ref 8.5–10.1)
CHLORIDE SERPL-SCNC: 104 MMOL/L — SIGNIFICANT CHANGE UP (ref 96–108)
CO2 SERPL-SCNC: 31 MMOL/L — SIGNIFICANT CHANGE UP (ref 22–31)
COLOR SPEC: YELLOW — SIGNIFICANT CHANGE UP
CREAT SERPL-MCNC: 0.95 MG/DL — SIGNIFICANT CHANGE UP (ref 0.5–1.3)
DIFF PNL FLD: NEGATIVE — SIGNIFICANT CHANGE UP
EGFR: 70 ML/MIN/1.73M2 — SIGNIFICANT CHANGE UP
EPI CELLS # UR: SIGNIFICANT CHANGE UP
GLUCOSE SERPL-MCNC: 108 MG/DL — HIGH (ref 70–99)
GLUCOSE UR QL: 250 MG/DL
HCG SERPL-ACNC: <1 MIU/ML — SIGNIFICANT CHANGE UP
KETONES UR-MCNC: NEGATIVE — SIGNIFICANT CHANGE UP
LEUKOCYTE ESTERASE UR-ACNC: NEGATIVE — SIGNIFICANT CHANGE UP
LIDOCAIN IGE QN: 106 U/L — SIGNIFICANT CHANGE UP (ref 73–393)
MAGNESIUM SERPL-MCNC: 2 MG/DL — SIGNIFICANT CHANGE UP (ref 1.6–2.6)
NITRITE UR-MCNC: NEGATIVE — SIGNIFICANT CHANGE UP
NT-PROBNP SERPL-SCNC: 2647 PG/ML — HIGH (ref 0–125)
PH UR: 7 — SIGNIFICANT CHANGE UP (ref 5–8)
POTASSIUM SERPL-MCNC: 4.2 MMOL/L — SIGNIFICANT CHANGE UP (ref 3.5–5.3)
POTASSIUM SERPL-SCNC: 4.2 MMOL/L — SIGNIFICANT CHANGE UP (ref 3.5–5.3)
PROT SERPL-MCNC: 6.3 GM/DL — SIGNIFICANT CHANGE UP (ref 6–8.3)
PROT UR-MCNC: 15 MG/DL
RAPID RVP RESULT: SIGNIFICANT CHANGE UP
RBC CASTS # UR COMP ASSIST: SIGNIFICANT CHANGE UP /HPF (ref 0–4)
SARS-COV-2 RNA SPEC QL NAA+PROBE: SIGNIFICANT CHANGE UP
SODIUM SERPL-SCNC: 138 MMOL/L — SIGNIFICANT CHANGE UP (ref 135–145)
SP GR SPEC: 1 — LOW (ref 1.01–1.02)
TROPONIN I, HIGH SENSITIVITY RESULT: 55 NG/L — HIGH
TROPONIN I, HIGH SENSITIVITY RESULT: 59.9 NG/L — HIGH
UROBILINOGEN FLD QL: NEGATIVE MG/DL — SIGNIFICANT CHANGE UP
WBC UR QL: SIGNIFICANT CHANGE UP

## 2023-04-21 PROCEDURE — 71045 X-RAY EXAM CHEST 1 VIEW: CPT | Mod: 26

## 2023-04-21 NOTE — ED ADULT NURSE REASSESSMENT NOTE - NS ED NURSE REASSESS COMMENT FT1
pT aoX4. Pt reports no acute distress at this time. Pt requested to take propafenone 150mg every 8 hrs for Afib. MD advised it is okay. pt took the medication. Will continue to monitor.

## 2023-06-14 ENCOUNTER — APPOINTMENT (OUTPATIENT)
Dept: SPINE | Facility: CLINIC | Age: 58
End: 2023-06-14

## 2024-01-07 ENCOUNTER — NON-APPOINTMENT (OUTPATIENT)
Age: 59
End: 2024-01-07

## 2024-09-29 ENCOUNTER — NON-APPOINTMENT (OUTPATIENT)
Age: 59
End: 2024-09-29

## 2025-01-02 NOTE — ED PROVIDER NOTE - NSTIMEPROVIDERCAREINITIATE_GEN_ER
HPI   Chief Complaint   Patient presents with    Blood in Urine       79-year-old female presents today with gross hematuria today.  She has right flank pain that radiates to her right groin.  She has a history of kidney stones on the left.  She endorses urgency.  She states that she is feeling weak and dizzy.  She indicates that the pain that is sharp usually resolves on its own.  She denies any recent trauma or fall.  She denies any neurologic deficit.  She denies chest pain.  She denies dyspnea.  Her daughter is bedside.      History provided by:  Patient and relative          Patient History   Past Medical History:   Diagnosis Date    Generalized anxiety disorder 04/15/2015    Generalized anxiety disorder    Other specified postprocedural states     H/O colonoscopy    Personal history of other medical treatment     H/O bone density study    Personal history of other medical treatment     History of mammogram    Vitamin D deficiency, unspecified     Vitamin D deficiency     No past surgical history on file.  No family history on file.  Social History     Tobacco Use    Smoking status: Former     Types: Cigarettes    Smokeless tobacco: Former   Substance Use Topics    Alcohol use: Yes     Comment: rarely    Drug use: Never       Physical Exam   ED Triage Vitals [01/02/25 1114]   Temperature Heart Rate Respirations BP   36.8 °C (98.3 °F) 81 16 119/68      Pulse Ox Temp src Heart Rate Source Patient Position   96 % -- -- --      BP Location FiO2 (%)     -- --       Physical Exam  Constitutional:       Appearance: Normal appearance.   HENT:      Head: Normocephalic and atraumatic.      Right Ear: Tympanic membrane normal.      Left Ear: Tympanic membrane normal.      Nose: Nose normal.      Mouth/Throat:      Mouth: Mucous membranes are moist.   Eyes:      Extraocular Movements: Extraocular movements intact.      Pupils: Pupils are equal, round, and reactive to light.   Cardiovascular:      Rate and Rhythm: Normal  rate.      Pulses: Normal pulses.      Heart sounds: Normal heart sounds.   Pulmonary:      Effort: Pulmonary effort is normal.      Breath sounds: Normal breath sounds.   Abdominal:      General: Abdomen is flat.      Palpations: Abdomen is soft.   Musculoskeletal:         General: Normal range of motion.      Cervical back: Normal range of motion and neck supple.   Skin:     General: Skin is warm.      Capillary Refill: Capillary refill takes less than 2 seconds.   Neurological:      General: No focal deficit present.      Mental Status: She is alert and oriented to person, place, and time.   Psychiatric:         Mood and Affect: Mood normal.         Behavior: Behavior normal.           ED Course & MDM   Diagnoses as of 01/02/25 1614   UTI (urinary tract infection), uncomplicated   Hematuria, unspecified type   Hypokalemia   Hyponatremia                 No data recorded                                 Medical Decision Making  CT abdomen pelvis noncontrast ordered.  Urinalysis, CBC CMP troponin and EKG ordered for the weakness and dizziness.  EKG is sinus rhythm without ST elevation or depression with premature complexes. Vital signs rechecked twice and stable.  Her troponin was normal at 12.  Her potassium was 3.3 and I repleted with 20 mill equivalent liquid potassium.  Glucose was 128, sodium 131. Calcium 8.5.  CBC was negative for leukocytosis left shift.  Urinalysis was concerning for urinary tract infection with 50 white blood cells, +1 bacteria, hyaline cast, and 500 leukocytes.  Patient was placed on Keflex 4 times daily for 10 days with concern for pyelonephritis.  The CT of the abdomen pelvis without contrast showed no acute intra-abdominal process.  There was diverticulosis without diverticulitis.  Nonobstructing bilateral renal calculi which was less concerning as they have remained in the renal pelvis.  She was referred to urology for outpatient follow-up.  Her EKG being stable and patient having no  neurologic deficit patient was safely discharged home with return precautions    Amount and/or Complexity of Data Reviewed  Labs: ordered.  Radiology: ordered and independent interpretation performed.  ECG/medicine tests: ordered and independent interpretation performed.     Details: Sinus rhythm with premature complexes 69 bpm.  No ST elevation or depression.  Interpreted both by attending and myself.  ME interval is normal and QT corrected is normal.  Similar to prior EKGs.        Procedure  Procedures     WANDA Garcia  01/02/25 1606       WANDA Garcia  01/02/25 1618     20-Apr-2023 23:24

## 2025-08-20 ENCOUNTER — NON-APPOINTMENT (OUTPATIENT)
Age: 60
End: 2025-08-20

## 2025-08-22 ENCOUNTER — APPOINTMENT (OUTPATIENT)
Dept: OBGYN | Facility: CLINIC | Age: 60
End: 2025-08-22

## 2025-08-22 ENCOUNTER — NON-APPOINTMENT (OUTPATIENT)
Age: 60
End: 2025-08-22

## 2025-08-22 DIAGNOSIS — Z13.31 ENCOUNTER FOR SCREENING FOR DEPRESSION: ICD-10-CM

## 2025-08-22 DIAGNOSIS — I50.9 HEART FAILURE, UNSPECIFIED: ICD-10-CM

## 2025-08-22 DIAGNOSIS — Z82.49 FAMILY HISTORY OF ISCHEMIC HEART DISEASE AND OTHER DISEASES OF THE CIRCULATORY SYSTEM: ICD-10-CM

## 2025-08-22 DIAGNOSIS — Z83.3 FAMILY HISTORY OF DIABETES MELLITUS: ICD-10-CM

## 2025-08-22 DIAGNOSIS — Z83.2 FAMILY HISTORY OF DISEASES OF THE BLOOD AND BLOOD-FORMING ORGANS AND CERTAIN DISORDERS INVOLVING THE IMMUNE MECHANISM: ICD-10-CM

## 2025-08-22 DIAGNOSIS — I27.20 PULMONARY HYPERTENSION, UNSPECIFIED: ICD-10-CM

## 2025-08-22 DIAGNOSIS — Z82.3 FAMILY HISTORY OF STROKE: ICD-10-CM

## 2025-08-22 DIAGNOSIS — D86.9 SARCOIDOSIS, UNSPECIFIED: ICD-10-CM

## 2025-08-22 DIAGNOSIS — Z01.419 ENCOUNTER FOR GYNECOLOGICAL EXAMINATION (GENERAL) (ROUTINE) W/OUT ABNORMAL FINDINGS: ICD-10-CM

## 2025-08-22 DIAGNOSIS — Z80.42 FAMILY HISTORY OF MALIGNANT NEOPLASM OF PROSTATE: ICD-10-CM

## 2025-08-22 PROCEDURE — 99459 PELVIC EXAMINATION: CPT

## 2025-08-22 PROCEDURE — 99386 PREV VISIT NEW AGE 40-64: CPT

## 2025-08-22 PROCEDURE — G0444 DEPRESSION SCREEN ANNUAL: CPT | Mod: 59

## 2025-08-22 RX ORDER — METOPROLOL TARTRATE 75 MG/1
TABLET, FILM COATED ORAL
Refills: 0 | Status: ACTIVE | COMMUNITY

## 2025-08-22 RX ORDER — SPIRONOLACTONE 50 MG/1
TABLET ORAL
Refills: 0 | Status: ACTIVE | COMMUNITY

## 2025-08-22 RX ORDER — SELEXIPAG 1200 UG/1
TABLET, COATED ORAL
Refills: 0 | Status: ACTIVE | COMMUNITY

## 2025-08-22 RX ORDER — KRILL/OM-3/DHA/EPA/PHOSPHO/AST 1000-230MG
81 CAPSULE ORAL
Refills: 0 | Status: ACTIVE | COMMUNITY

## 2025-08-22 RX ORDER — RIOCIGUAT 2.5 MG/1
TABLET, FILM COATED ORAL
Refills: 0 | Status: ACTIVE | COMMUNITY

## 2025-08-22 RX ORDER — METHOTREXATE 2.5 MG/1
2.5 TABLET ORAL AS DIRECTED
Refills: 0 | Status: ACTIVE | COMMUNITY

## 2025-08-22 RX ORDER — DAPAGLIFLOZIN 10 MG/1
TABLET, FILM COATED ORAL
Refills: 0 | Status: ACTIVE | COMMUNITY

## 2025-08-22 RX ORDER — FOLIC ACID 20 MG
CAPSULE ORAL
Refills: 0 | Status: ACTIVE | COMMUNITY

## 2025-08-22 RX ORDER — MACITENTAN 10 MG/1
10 TABLET, FILM COATED ORAL
Refills: 0 | Status: ACTIVE | COMMUNITY

## 2025-08-22 RX ORDER — APIXABAN 5 MG/1
TABLET, FILM COATED ORAL
Refills: 0 | Status: ACTIVE | COMMUNITY

## 2025-08-28 LAB
CYTOLOGY CVX/VAG DOC THIN PREP: ABNORMAL
HPV HIGH+LOW RISK DNA PNL CVX: NOT DETECTED